# Patient Record
Sex: MALE | Race: BLACK OR AFRICAN AMERICAN | Employment: UNEMPLOYED | URBAN - METROPOLITAN AREA
[De-identification: names, ages, dates, MRNs, and addresses within clinical notes are randomized per-mention and may not be internally consistent; named-entity substitution may affect disease eponyms.]

---

## 2017-03-04 ENCOUNTER — APPOINTMENT (OUTPATIENT)
Dept: GENERAL RADIOLOGY | Facility: CLINIC | Age: 32
End: 2017-03-04
Attending: EMERGENCY MEDICINE
Payer: COMMERCIAL

## 2017-03-04 ENCOUNTER — HOSPITAL ENCOUNTER (EMERGENCY)
Facility: CLINIC | Age: 32
Discharge: HOME | End: 2017-03-05
Attending: EMERGENCY MEDICINE | Admitting: EMERGENCY MEDICINE
Payer: COMMERCIAL

## 2017-03-04 DIAGNOSIS — T40.601A OPIATE OVERDOSE, ACCIDENTAL OR UNINTENTIONAL, INITIAL ENCOUNTER (H): ICD-10-CM

## 2017-03-04 LAB
ALBUMIN SERPL-MCNC: 4.2 G/DL (ref 3.4–5)
ALP SERPL-CCNC: 64 U/L (ref 40–150)
ALT SERPL W P-5'-P-CCNC: 28 U/L (ref 0–70)
ANION GAP SERPL CALCULATED.3IONS-SCNC: 8 MMOL/L (ref 3–14)
APAP SERPL-MCNC: NORMAL MG/L (ref 10–20)
AST SERPL W P-5'-P-CCNC: 16 U/L (ref 0–45)
BASOPHILS # BLD AUTO: 0 10E9/L (ref 0–0.2)
BASOPHILS NFR BLD AUTO: 0.2 %
BILIRUB SERPL-MCNC: 1.1 MG/DL (ref 0.2–1.3)
BUN SERPL-MCNC: 10 MG/DL (ref 7–30)
CALCIUM SERPL-MCNC: 8.2 MG/DL (ref 8.5–10.1)
CHLORIDE SERPL-SCNC: 102 MMOL/L (ref 94–109)
CO2 SERPL-SCNC: 28 MMOL/L (ref 20–32)
CREAT SERPL-MCNC: 0.96 MG/DL (ref 0.66–1.25)
DIFFERENTIAL METHOD BLD: NORMAL
EOSINOPHIL # BLD AUTO: 0 10E9/L (ref 0–0.7)
EOSINOPHIL NFR BLD AUTO: 0.2 %
ERYTHROCYTE [DISTWIDTH] IN BLOOD BY AUTOMATED COUNT: 12.6 % (ref 10–15)
ETHANOL SERPL-MCNC: <0.01 G/DL
GFR SERPL CREATININE-BSD FRML MDRD: ABNORMAL ML/MIN/1.7M2
GLUCOSE SERPL-MCNC: 83 MG/DL (ref 70–99)
HCT VFR BLD AUTO: 43.5 % (ref 40–53)
HGB BLD-MCNC: 14.8 G/DL (ref 13.3–17.7)
IMM GRANULOCYTES # BLD: 0 10E9/L (ref 0–0.4)
IMM GRANULOCYTES NFR BLD: 0.2 %
LYMPHOCYTES # BLD AUTO: 1.9 10E9/L (ref 0.8–5.3)
LYMPHOCYTES NFR BLD AUTO: 40.9 %
MCH RBC QN AUTO: 31 PG (ref 26.5–33)
MCHC RBC AUTO-ENTMCNC: 34 G/DL (ref 31.5–36.5)
MCV RBC AUTO: 91 FL (ref 78–100)
MONOCYTES # BLD AUTO: 0.3 10E9/L (ref 0–1.3)
MONOCYTES NFR BLD AUTO: 6.4 %
NEUTROPHILS # BLD AUTO: 2.4 10E9/L (ref 1.6–8.3)
NEUTROPHILS NFR BLD AUTO: 52.1 %
NRBC # BLD AUTO: 0 10*3/UL
NRBC BLD AUTO-RTO: 0 /100
PLATELET # BLD AUTO: 239 10E9/L (ref 150–450)
POTASSIUM SERPL-SCNC: 3.7 MMOL/L (ref 3.4–5.3)
PROT SERPL-MCNC: 8 G/DL (ref 6.8–8.8)
RBC # BLD AUTO: 4.78 10E12/L (ref 4.4–5.9)
SALICYLATES SERPL-MCNC: NORMAL MG/DL
SODIUM SERPL-SCNC: 138 MMOL/L (ref 133–144)
WBC # BLD AUTO: 4.7 10E9/L (ref 4–11)

## 2017-03-04 PROCEDURE — 70360 X-RAY EXAM OF NECK: CPT

## 2017-03-04 PROCEDURE — 85025 COMPLETE CBC W/AUTO DIFF WBC: CPT | Performed by: EMERGENCY MEDICINE

## 2017-03-04 PROCEDURE — 96374 THER/PROPH/DIAG INJ IV PUSH: CPT

## 2017-03-04 PROCEDURE — 80053 COMPREHEN METABOLIC PANEL: CPT | Performed by: EMERGENCY MEDICINE

## 2017-03-04 PROCEDURE — 25000128 H RX IP 250 OP 636: Performed by: EMERGENCY MEDICINE

## 2017-03-04 PROCEDURE — 80329 ANALGESICS NON-OPIOID 1 OR 2: CPT | Performed by: EMERGENCY MEDICINE

## 2017-03-04 PROCEDURE — 99284 EMERGENCY DEPT VISIT MOD MDM: CPT | Mod: 25

## 2017-03-04 PROCEDURE — 80320 DRUG SCREEN QUANTALCOHOLS: CPT | Performed by: EMERGENCY MEDICINE

## 2017-03-04 RX ORDER — ONDANSETRON 2 MG/ML
4 INJECTION INTRAMUSCULAR; INTRAVENOUS ONCE
Status: DISCONTINUED | OUTPATIENT
Start: 2017-03-04 | End: 2017-03-05 | Stop reason: HOSPADM

## 2017-03-04 RX ORDER — LIDOCAINE 40 MG/G
CREAM TOPICAL
Status: DISCONTINUED | OUTPATIENT
Start: 2017-03-04 | End: 2017-03-05 | Stop reason: HOSPADM

## 2017-03-04 RX ORDER — NALOXONE HYDROCHLORIDE 0.4 MG/ML
.1-.4 INJECTION, SOLUTION INTRAMUSCULAR; INTRAVENOUS; SUBCUTANEOUS
Status: DISCONTINUED | OUTPATIENT
Start: 2017-03-04 | End: 2017-03-05 | Stop reason: HOSPADM

## 2017-03-04 RX ADMIN — NALOXONE HYDROCHLORIDE 0.1 MG: 0.4 INJECTION, SOLUTION INTRAMUSCULAR; INTRAVENOUS; SUBCUTANEOUS at 23:12

## 2017-03-04 ASSESSMENT — ENCOUNTER SYMPTOMS
VOMITING: 0
DIZZINESS: 0
LIGHT-HEADEDNESS: 0
FEVER: 0
SORE THROAT: 0
NAUSEA: 0
SHORTNESS OF BREATH: 0
CHILLS: 0
FACIAL SWELLING: 0
RHINORRHEA: 0
COUGH: 0
HEADACHES: 0
TROUBLE SWALLOWING: 1
NECK PAIN: 1

## 2017-03-04 NOTE — ED AVS SNAPSHOT
Emergency Department    [address] [place] [address]    Phone:  [phone]    Fax:  [phone]                                       Brian Rodas   MRN: [id number]    Department:   Emergency Department   Date of Visit:  3/4/2017           After Visit Summary Signature Page     I have received my discharge instructions, and my questions have been answered. I have discussed any challenges I see with this plan with the nurse or doctor.    ..........................................................................................................................................  Patient/Patient Representative Signature      ..........................................................................................................................................  Patient Representative Print Name and Relationship to Patient    ..................................................               ................................................  Date                                            Time    ..........................................................................................................................................  Reviewed by Signature/Title    ...................................................              ..............................................  Date                                                            Time

## 2017-03-04 NOTE — ED AVS SNAPSHOT
Emergency Department    [address] [facility] [address]    Phone:  [phone]    Fax:  [phone]                                       Brian Rodas   MRN: [id number]    Department:   Emergency Department   Date of Visit:  3/4/2017           Patient Information     Date Of Birth          1985        Your diagnoses for this visit were:     Opiate overdose, accidental or unintentional, initial encounter        You were seen by Ismael Carmichael MD.      Follow-up Information     Follow up with [place] MD, [place]. Schedule an appointment as soon as possible for a visit in 2 days.    Specialty:  Clinic    Why:  As needed        Follow up with  Emergency Department.    Specialty:  EMERGENCY MEDICINE    Why:  If symptoms worsen    Contact information:    [address] [facility] [id number] [phone]        Discharge Instructions         Accidental Ingestion: Nontoxic (Adult)  You have been evaluated and treated for accidentally taking too much of a medicine or swallowing a chemical product. There is no sign of toxic effect at this time. It is not likely that any new symptoms will appear. To be safe, watch for symptoms during the next 24 hours (see below). The symptom will depend on what was swallowed.  Home care    If liquid charcoal was given to neutralize what was swallowed, it will give a black color to the stools for 1 to 2 days. Usually, a laxative is given with charcoal. This speeds the removal of any toxins from the intestines. This may cause diarrhea for up to 24 hours.    If you have been given charcoal but no laxative, you may become constipated. If this occurs, you may take an over-the-counter laxative.  Prevention    Keep medicines, pesticides, and other household chemicals in their original containers.    Clearly radha all harmful products if a different bottle is used.  Note: In the future, if you or someone you know takes something possibly  harmful, and you are not sure what to do, call the [employer] of Poison Control Centers. The phone number is 1-651.621.8399. The phone line is staffed 24 hours a day. If you call, you will be connected to the poison control center closest to you.   Follow-up care  Follow up with your health care provider, or as advised.  Call 101  Contact your local emergency services right away if any of these occur.    Trouble breathing or swallowing, wheezing    Severe confusion    Extreme drowsiness or trouble awakening    Fainting or loss of consciousness    Rapid heart rate     Very slow heart rate    Very low or very high blood pressure    Vomiting blood, or large amounts of blood in stool    Seizure  When to seek medical advice  Call your health care provider right away if any of these occur.    Shakiness    Fast breathing (over 25 breaths per minute) or slow breathing (less than 8 breaths per minute)    Shortness of breath    Fever of 100.4 F (38 C) or higher, or as directed by your healthcare provider    Vomiting or diarrhea for more than 24 hours    Abdominal pain    Dizziness or weakness    1797-0471 [employer]. [address] [place] [address]. All rights reserved. This information is not intended as a substitute for professional medical care. Always follow your healthcare professional's instructions.          Discharge References/Attachments     OVERDOSE, OPIATE (ENGLISH)      24 Hour Appointment Hotline       To make an appointment at any [facility], call [phone] ([phone]). If you don't have a family doctor or clinic, we will help you find one. [facility] clinics are conveniently located to serve the needs of you and your family.             Review of your medicines      Our records show that you are taking the medicines listed below. If these are incorrect, please call your family doctor or clinic.        Dose / Directions Last dose taken    alum & mag  hydroxide-simethicone 200-200-20 MG/5ML Susp suspension   Commonly known as:  MYLANTA/MAALOX   Dose:  30 mL        Take 30 mLs by mouth every 6 hours as needed for indigestion   Refills:  0        cyclobenzaprine 10 MG tablet   Commonly known as:  FLEXERIL   Dose:  10 mg   Quantity:  30 tablet        Take 1 tablet (10 mg) by mouth At Bedtime   Refills:  0        diphenhydrAMINE 50 MG capsule   Commonly known as:  BENADRYL   Dose:  50 mg   Quantity:  30 capsule        Take 1 capsule (50 mg) by mouth At Bedtime   Refills:  0        guaiFENesin 20 mg/mL Soln solution   Commonly known as:  ROBITUSSIN   Dose:  10 mL        Take 10 mLs by mouth every 4 hours as needed for cough   Refills:  0        hydrOXYzine 25 MG tablet   Commonly known as:  ATARAX   Dose:  25-50 mg   Quantity:  60 tablet        Take 1-2 tablets (25-50 mg) by mouth every 4 hours as needed for anxiety   Refills:  1        IBUPROFEN PO   Dose:  200-400 mg        Take 200-400 mg by mouth every 6 hours as needed for moderate pain   Refills:  0        loperamide 2 MG capsule   Commonly known as:  IMODIUM   Dose:  2 mg        Take 2 mg by mouth 4 times daily as needed for diarrhea   Refills:  0        loratadine 10 MG tablet   Commonly known as:  CLARITIN   Dose:  10 mg        Take 10 mg by mouth daily as needed for allergies   Refills:  0        magnesium hydroxide 2400 MG/10ML Susp   Commonly known as:  MOM   Dose:  30 mL        Take 30 mLs by mouth daily as needed for constipation   Refills:  0        mirtazapine 30 MG tablet   Commonly known as:  REMERON   Dose:  30 mg   Indication:  Trouble Sleeping   Quantity:  30 tablet        Take 1 tablet (30 mg) by mouth At Bedtime   Refills:  0        NICORELIEF 4 MG gum   Dose:  4 mg   Generic drug:  nicotine polacrilex        Place 4 mg inside cheek as needed for smoking cessation   Refills:  0        phenol-menthol 14.5 MG lozenge   Dose:  1 lozenge        Place 1 lozenge inside cheek every 2 hours as needed  for moderate pain   Refills:  0        sertraline 100 MG tablet   Commonly known as:  ZOLOFT   Dose:  100 mg   Quantity:  30 tablet        Take 1 tablet (100 mg) by mouth daily   Refills:  0        traZODone 100 MG tablet   Commonly known as:  DESYREL   Dose:   mg   Quantity:  30 tablet        Take 0.5-1 tablets ( mg) by mouth nightly as needed for sleep   Refills:  1                Procedures and tests performed during your visit     Acetaminophen level    CBC with platelets differential    Comprehensive metabolic panel    Ethanol level    Neck soft tissue XR    Salicylate level      Orders Needing Specimen Collection     Ordered          03/04/17 2253  Drug abuse screen 77 urine ([place] - STAT, [place]: STAT, Needs to be Collected     Scheduled Task Status   03/04/17 2251 Collect Drug abuse screen 77 urine ([place]) Open   Order Class:  PCU Collect                  Pending Results     No orders found for last 3 day(s).            Pending Culture Results     No orders found for last 3 day(s).             Test Results from your hospital stay     3/4/2017 11:11 PM - Interface, UrGift Results      Component Results     Component Value Ref Range & Units Status    WBC 4.7 4.0 - 11.0 10e9/L Final    RBC Count 4.78 4.4 - 5.9 10e12/L Final    Hemoglobin 14.8 13.3 - 17.7 g/dL Final    Hematocrit 43.5 40.0 - 53.0 % Final    MCV 91 78 - 100 fl Final    MCH 31.0 26.5 - 33.0 pg Final    MCHC 34.0 31.5 - 36.5 g/dL Final    RDW 12.6 10.0 - 15.0 % Final    Platelet Count 239 150 - 450 10e9/L Final    Diff Method Automated Method  Final    % Neutrophils 52.1 % Final    % Lymphocytes 40.9 % Final    % Monocytes 6.4 % Final    % Eosinophils 0.2 % Final    % Basophils 0.2 % Final    % Immature Granulocytes 0.2 % Final    Nucleated RBCs 0 0 /100 Final    Absolute Neutrophil 2.4 1.6 - 8.3 10e9/L Final    Absolute Lymphocytes 1.9 0.8 - 5.3 10e9/L Final    Absolute Monocytes 0.3 0.0 - 1.3 10e9/L Final    Absolute  Eosinophils 0.0 0.0 - 0.7 10e9/L Final    Absolute Basophils 0.0 0.0 - 0.2 10e9/L Final    Abs Immature Granulocytes 0.0 0 - 0.4 10e9/L Final    Absolute Nucleated RBC 0.0  Final         3/4/2017 11:27 PM - Interface, Flexilab Results      Component Results     Component Value Ref Range & Units Status    Sodium 138 133 - 144 mmol/L Final    Potassium 3.7 3.4 - 5.3 mmol/L Final    Chloride 102 94 - 109 mmol/L Final    Carbon Dioxide 28 20 - 32 mmol/L Final    Anion Gap 8 3 - 14 mmol/L Final    Glucose 83 70 - 99 mg/dL Final    Urea Nitrogen 10 7 - 30 mg/dL Final    Creatinine 0.96 0.66 - 1.25 mg/dL Final    GFR Estimate >90  Non  GFR Calc   >60 mL/min/1.7m2 Final    GFR Estimate If Black >90   GFR Calc   >60 mL/min/1.7m2 Final    Calcium 8.2 (L) 8.5 - 10.1 mg/dL Final    Bilirubin Total 1.1 0.2 - 1.3 mg/dL Final    Albumin 4.2 3.4 - 5.0 g/dL Final    Protein Total 8.0 6.8 - 8.8 g/dL Final    Alkaline Phosphatase 64 40 - 150 U/L Final    ALT 28 0 - 70 U/L Final    AST 16 0 - 45 U/L Final         3/4/2017 11:50 PM - Interface, Flexilab Results      Component Results     Component Value Ref Range & Units Status    Salicylate Level <2  Therapeutic:        <20   Anti inflammatory:  15-30   mg/dL Final         3/4/2017 11:52 PM - Interface, Flexilab Results      Component Results     Component Value Ref Range & Units Status    Acetaminophen Level <2  Therapeutic range: 10-20 mg/L   mg/L Final         3/4/2017 11:25 PM - Interface, Flexilab Results      Component Results     Component Value Ref Range & Units Status    Ethanol g/dL <0.01 <0.01 g/dL Final         3/4/2017 11:50 PM - Interface, Radiant Ib      Narrative     SOFT TISSUE NECK ONE VIEW   3/4/2017 11:47 PM     HISTORY: Dysphagia.    COMPARISON: None.        Impression     IMPRESSION: No prevertebral soft tissue swelling. The epiglottis is  normal. No cause for dysphagia is identified.    KIRAN PERSON MD                Clinical  Quality Measure: Blood Pressure Screening     Your blood pressure was checked while you were in the emergency department today. The last reading we obtained was  BP: 133/88 . Please read the guidelines below about what these numbers mean and what you should do about them.  If your systolic blood pressure (the top number) is less than 120 and your diastolic blood pressure (the bottom number) is less than 80, then your blood pressure is normal. There is nothing more that you need to do about it.  If your systolic blood pressure (the top number) is 120-139 or your diastolic blood pressure (the bottom number) is 80-89, your blood pressure may be higher than it should be. You should have your blood pressure rechecked within a year by a primary care provider.  If your systolic blood pressure (the top number) is 140 or greater or your diastolic blood pressure (the bottom number) is 90 or greater, you may have high blood pressure. High blood pressure is treatable, but if left untreated over time it can put you at risk for heart attack, stroke, or kidney failure. You should have your blood pressure rechecked by a primary care provider within the next 4 weeks.  If your provider in the emergency department today gave you specific instructions to follow-up with your doctor or provider even sooner than that, you should follow that instruction and not wait for up to 4 weeks for your follow-up visit.        Thank you for choosing [facility]       Thank you for choosing [facility] for your care. Our goal is always to provide you with excellent care. Hearing back from our patients is one way we can continue to improve our services. Please take a few minutes to complete the written survey that you may receive in the mail after you visit with us. Thank you!        [facility] Information     [facility] lets you send messages to your doctor, view your test results, renew your prescriptions, schedule appointments and more. To sign up, go to  [email or web address] . Click on \"Log in\" on the left side of the screen, which will take you to the Welcome page. Then click on \"Sign up Now\" on the right side of the page.     You will be asked to enter the access code listed below, as well as some personal information. Please follow the directions to create your username and password.     Your access code is: [email or web address]  Expires: 6/3/2017  1:57 AM     Your access code will  in 90 days. If you need help or a new code, please call your [facility] clinic or [phone].        Care EveryWhere ID     This is your Care EveryWhere ID. This could be used by other organizations to access your [facility] medical records  [id number]        After Visit Summary       This is your record. Keep this with you and show to your community pharmacist(s) and doctor(s) at your next visit.                  "

## 2017-03-05 VITALS
TEMPERATURE: 98.6 F | WEIGHT: 146.6 LBS | RESPIRATION RATE: 16 BRPM | DIASTOLIC BLOOD PRESSURE: 80 MMHG | SYSTOLIC BLOOD PRESSURE: 132 MMHG | HEIGHT: 67 IN | BODY MASS INDEX: 23.01 KG/M2 | HEART RATE: 76 BPM | OXYGEN SATURATION: 97 %

## 2017-03-05 ASSESSMENT — ENCOUNTER SYMPTOMS: BACK PAIN: 1

## 2017-03-05 NOTE — ED PROVIDER NOTES
"  History     Chief Complaint:  Difficulty Swallowing    HPI   Brian Rodas is a 31 year old male with a history of chronic back pain, substance abuse and heroine overdose in June 2016 who presents with concern for difficulty swallowing.  The patient reports that he has been sober from opiates for a year. The patient reports that he has chronic back pain but had exacerbation of his chronic pain this evening after lifting. He reports that he only took one Oxycodone around 8:30 this evening, but brother states that he took \"way too much medication\". The patient reports that he last ate a meal around 1pm. He tried eating a granola bar around 9pm this evening but states he was unable to swallow it, so he tried to take some sips of water but these also would not go down. The patient continued to have difficulty swallowing, prompting him to come to the ED for evaluation. On arrival to the ED, the patient reports that it feels like the muscles in his throat \"just aren't working\" and he feels like nothing will go down his throat. He states he has never had difficulty swallowing like this in the past. He reports that he feels very drowsy since taking the Oxycodone. He also states that he has pain in the back of his neck that he has had for the last couple of days. He denies any throat swelling, sore throat, throat tightness, drooling, or sensation of something stuck in his throat. He denies any recent sore throat, cough, congestion, or illness. No headache, chest pain, shortness of breath, rash, or itchiness. He denies any recent medication changes or new exposures.     Patient later states on re-evaluation that he took 40 mg of crushed up Oxycontin that was not his.     Allergies:  No Known Allergies     Medications:    nicotine polacrilex (NICORELIEF) 4 MG gum  traZODone (DESYREL) 100 MG tablet  diphenhydrAMINE (BENADRYL) 50 MG capsule  IBUPROFEN PO  guaiFENesin (ROBITUSSIN) 100 MG/5ML SOLN  loratadine (CLARITIN) 10 MG " "tablet  magnesium hydroxide (MOM) 2400 MG/10ML SUSP  loperamide (IMODIUM) 2 MG capsule  hydrOXYzine (ATARAX) 25 MG tablet  mirtazapine (REMERON) 30 MG tablet  sertraline (ZOLOFT) 100 MG tablet  cyclobenzaprine (FLEXERIL) 10 MG tablet    Past Medical History:    Anxiety  Depression  Substance abuse   Chronic back pain    Past Surgical History:    Mandible surgery    Family History:    History reviewed. No pertinent family history.     Social History:  Smoking status: Current everyday smoker  Alcohol use: No  Marital Status:  Single [1]     Review of Systems   Constitutional: Negative for chills and fever.        Drowsy   HENT: Positive for trouble swallowing. Negative for congestion, drooling, facial swelling, rhinorrhea and sore throat.    Respiratory: Negative for cough and shortness of breath.    Cardiovascular: Negative for chest pain.   Gastrointestinal: Negative for nausea and vomiting.   Musculoskeletal: Positive for back pain and neck pain.   Skin: Negative for rash.   Neurological: Negative for dizziness, light-headedness and headaches.   All other systems reviewed and are negative.      Physical Exam   Patient Vitals for the past 24 hrs:   BP Temp Temp src Pulse Resp SpO2 Height Weight   03/05/17 0240 - - - - - 97 % - -   03/05/17 0238 132/80 - - 76 16 - - -   03/05/17 0130 - - - - - 95 % - -   03/05/17 0100 - - - - - 98 % - -   03/05/17 0051 - - - - - 95 % - -   03/05/17 0042 - - - - - 100 % - -   03/04/17 2345 - - - - - 94 % - -   03/04/17 2315 - - - - - 100 % - -   03/04/17 2314 133/88 - - - - 99 % - -   03/04/17 2252 - - - - - 96 % - -   03/04/17 2233 148/69 98.6  F (37  C) Oral 101 16 98 % 1.702 m (5' 7\") 66.5 kg (146 lb 9.6 oz)       Physical Exam  Constitutional:  Appears well-developed and well-nourished. Cooperative. Sleepy but rousable. Answers questions appropriately   HENT:   Head:    Atraumatic.   Mouth/Throat:   Oropharynx is without erythema or exudate and mucous     membranes are moist. "   Eyes:    Conjunctivae normal and EOM are normal.       Pupils 2-3 mm and reactive. No photophobia.  Neck:    Normal range of motion. Neck supple. No stridor, no tenderness  Cardiovascular:  Normal rate, regular rhythm, normal heart sounds and radial and    dorsalis pedis pulses are 2+ and symmetric.    Pulmonary/Chest:  Effort normal and breath sounds normal.   Abdominal:   Soft. Bowel sounds are normal.      No splenomegaly or hepatomegaly. No tenderness. No rebound.   Musculoskeletal:  Normal range of motion. No edema and no tenderness.   Neurological:  Sleepy but rousable. Appears intoxicated. Answers questions appropriately.  Normal strength. No cranial nerve deficit.   Skin:    Skin is warm and dry.   Psychiatric:   Appears intoxicated and flat affect    Emergency Department Course   Imaging:  Radiographic findings were communicated with the patient who voiced understanding of the findings.    Neck soft tissue x-ray  No prevertebral soft tissue swelling. The epiglottis is  normal. No cause for dysphagia is identified.  As read by Radiology.    Laboratory:  CBC: WNL (WBC 4.7, HGB 14.8, )   CMP: Calcium 8.2(L), o/w WNL (Creatinine 0.96)    Ethanol level: <0.01  Acetaminophen level: <2  Salicylate level: <2    Interventions:  2312: Narcan 0.1 mg IV    Emergency Department Course:  Past medical records, nursing notes, and vitals reviewed.  2243: I performed an exam of the patient and obtained history, as documented above.  IV inserted and blood drawn. The patient was placed on continuous cardiac monitoring and pulse oximetry.  The patient was sent for a neck soft tissue x-ray while in the emergency department, findings above.    0050: I rechecked the patient. Explained findings to the patient. On recheck, the patient reports that he took 40 mg of crushed up Oxycontin that was not his own. He states his throat feels normal now. He is drinking water without any difficulty. Patient is still sleepy.     0112:  I spoke to Poison Control regarding the patient. Recommended observation for 3 hours after Narcan.     0231: I rechecked the patient. Explained findings to the patient. Patient still able to tolerate oral liquids without difficulty.     I rechecked the patient.  Findings and plan explained to the Patient. Patient discharged home with instructions regarding supportive care, medications, and reasons to return. The importance of close follow-up was reviewed.     Impression & Plan      Medical Decision Making:  Brian Rodas is a 31 year old male who presents after taking what he reported initially as one tablet of Oxycodone and then feeling sleepy and having difficulty with swallowing. There is no fevers, no throat pain, no swelling noted on exam. He does not have focal neurologic deficits. He doesn't have headache. I don't think infection or CNS lesion is the reason for his difficulty with swallowing.     On review of the patient's chart, he does have a history or opiate abuse and past overdose. He appears intoxicated here. A dose of Narcan was given. Laboratory testing was sent. There was no evidence of co-ingestion. Soft tissue neck x-ray looks normal without any prevertebral soft tissue swelling and normal looking epiglottis. After Narcan the patient reported his abnormal swelling felt normal and he was able to drink several glasses of water without any discomfort or difficulty.     After Narcan the patient was also able to tell me that he took a crushed 40 mg tablet of likely Oxycontin.    The patient said he was not trying to harm himself. He said he has been having some trouble with back pain and thought that might help his pain. He said his back pain is only 4/10. There is no radicular pain, no injury, and no role for emergent imaging of his spine.     The patient was observed for about 3 hours post his dose of Narcan. He has not become sleepy or required repeat dosing. The patient agrees to stop taking large  doses of opiates. He declined resources for treatment. He said he was not trying to harm himself. He left in good condition.     Diagnosis:    ICD-10-CM   1. Opiate overdose, accidental or unintentional, initial encounter T40.601A     Disposition: Discharged to home    Felisha Diane  3/4/2017    EMERGENCY DEPARTMENT    I, Felisha Diane, am serving as a scribe at 10:43 PM on 3/4/2017 to document services personally performed by Ismeal Carmichael MD based on my observations and the provider's statements to me.        Ismael Carmichael MD  03/05/17 0648

## 2017-03-26 ENCOUNTER — HOSPITAL ENCOUNTER (INPATIENT)
Facility: CLINIC | Age: 32
LOS: 1 days | Discharge: HOME | DRG: 881 | End: 2017-03-27
Attending: PSYCHIATRY & NEUROLOGY | Admitting: PSYCHIATRY & NEUROLOGY
Payer: COMMERCIAL

## 2017-03-26 ENCOUNTER — HOSPITAL ENCOUNTER (EMERGENCY)
Facility: CLINIC | Age: 32
Discharge: PSYCHIATRIC OR SUBSTANCE USE FACILITY | End: 2017-03-26
Attending: EMERGENCY MEDICINE | Admitting: EMERGENCY MEDICINE
Payer: COMMERCIAL

## 2017-03-26 VITALS
HEART RATE: 63 BPM | SYSTOLIC BLOOD PRESSURE: 100 MMHG | BODY MASS INDEX: 25.11 KG/M2 | OXYGEN SATURATION: 100 % | DIASTOLIC BLOOD PRESSURE: 69 MMHG | HEIGHT: 67 IN | RESPIRATION RATE: 18 BRPM | WEIGHT: 160 LBS | TEMPERATURE: 97.8 F

## 2017-03-26 DIAGNOSIS — F32.9 REACTIVE DEPRESSION: Primary | ICD-10-CM

## 2017-03-26 DIAGNOSIS — F19.10 DRUG ABUSE (H): ICD-10-CM

## 2017-03-26 DIAGNOSIS — F11.93 OPIOID WITHDRAWAL (H): ICD-10-CM

## 2017-03-26 DIAGNOSIS — F11.20 OPIOID USE DISORDER, MODERATE, DEPENDENCE (H): ICD-10-CM

## 2017-03-26 DIAGNOSIS — Z72.89 SELF-INJURIOUS BEHAVIOR: ICD-10-CM

## 2017-03-26 DIAGNOSIS — R44.0 AUDITORY HALLUCINATIONS: ICD-10-CM

## 2017-03-26 PROBLEM — F32.A DEPRESSION: Status: ACTIVE | Noted: 2017-03-26

## 2017-03-26 LAB
AMPHETAMINES UR QL SCN: ABNORMAL
BARBITURATES UR QL: ABNORMAL
BENZODIAZ UR QL: ABNORMAL
CANNABINOIDS UR QL SCN: ABNORMAL
COCAINE UR QL: ABNORMAL
OPIATES UR QL SCN: ABNORMAL
PCP UR QL SCN: ABNORMAL

## 2017-03-26 PROCEDURE — 12400001 ZZH R&B MH UMMC

## 2017-03-26 PROCEDURE — 80307 DRUG TEST PRSMV CHEM ANLYZR: CPT | Performed by: EMERGENCY MEDICINE

## 2017-03-26 PROCEDURE — 99285 EMERGENCY DEPT VISIT HI MDM: CPT | Mod: 25

## 2017-03-26 PROCEDURE — 25000132 ZZH RX MED GY IP 250 OP 250 PS 637: Performed by: STUDENT IN AN ORGANIZED HEALTH CARE EDUCATION/TRAINING PROGRAM

## 2017-03-26 PROCEDURE — 90791 PSYCH DIAGNOSTIC EVALUATION: CPT

## 2017-03-26 RX ORDER — TRAZODONE HYDROCHLORIDE 50 MG/1
50 TABLET, FILM COATED ORAL
Status: DISCONTINUED | OUTPATIENT
Start: 2017-03-26 | End: 2017-03-26

## 2017-03-26 RX ORDER — OLANZAPINE 10 MG/2ML
10 INJECTION, POWDER, FOR SOLUTION INTRAMUSCULAR
Status: DISCONTINUED | OUTPATIENT
Start: 2017-03-26 | End: 2017-03-27 | Stop reason: HOSPADM

## 2017-03-26 RX ORDER — LOPERAMIDE HCL 2 MG
2 CAPSULE ORAL 4 TIMES DAILY PRN
Status: DISCONTINUED | OUTPATIENT
Start: 2017-03-26 | End: 2017-03-27 | Stop reason: HOSPADM

## 2017-03-26 RX ORDER — OLANZAPINE 10 MG/1
10 TABLET ORAL
Status: DISCONTINUED | OUTPATIENT
Start: 2017-03-26 | End: 2017-03-27 | Stop reason: HOSPADM

## 2017-03-26 RX ORDER — GABAPENTIN 300 MG/1
300 CAPSULE ORAL 3 TIMES DAILY PRN
Status: DISCONTINUED | OUTPATIENT
Start: 2017-03-26 | End: 2017-03-27 | Stop reason: HOSPADM

## 2017-03-26 RX ORDER — TRAZODONE HYDROCHLORIDE 50 MG/1
50-100 TABLET, FILM COATED ORAL
Status: DISCONTINUED | OUTPATIENT
Start: 2017-03-26 | End: 2017-03-27 | Stop reason: HOSPADM

## 2017-03-26 RX ORDER — HYDROXYZINE HYDROCHLORIDE 25 MG/1
25-50 TABLET, FILM COATED ORAL EVERY 4 HOURS PRN
Status: DISCONTINUED | OUTPATIENT
Start: 2017-03-26 | End: 2017-03-27 | Stop reason: HOSPADM

## 2017-03-26 RX ORDER — ONDANSETRON 4 MG/1
4 TABLET, ORALLY DISINTEGRATING ORAL EVERY 6 HOURS PRN
Status: DISCONTINUED | OUTPATIENT
Start: 2017-03-26 | End: 2017-03-27 | Stop reason: HOSPADM

## 2017-03-26 RX ORDER — CLONIDINE HYDROCHLORIDE 0.1 MG/1
0.1 TABLET ORAL 3 TIMES DAILY PRN
Status: DISCONTINUED | OUTPATIENT
Start: 2017-03-26 | End: 2017-03-27 | Stop reason: HOSPADM

## 2017-03-26 RX ORDER — IBUPROFEN 200 MG
400 TABLET ORAL EVERY 6 HOURS PRN
Status: DISCONTINUED | OUTPATIENT
Start: 2017-03-26 | End: 2017-03-27 | Stop reason: HOSPADM

## 2017-03-26 RX ADMIN — TRAZODONE HYDROCHLORIDE 50 MG: 50 TABLET ORAL at 19:11

## 2017-03-26 RX ADMIN — CLONIDINE HYDROCHLORIDE 0.1 MG: 0.1 TABLET ORAL at 19:11

## 2017-03-26 ASSESSMENT — ACTIVITIES OF DAILY LIVING (ADL)
ORAL_HYGIENE: INDEPENDENT
DRESS: INDEPENDENT
LAUNDRY: WITH SUPERVISION
GROOMING: INDEPENDENT

## 2017-03-26 NOTE — IP AVS SNAPSHOT
MRN:[id number]                      After Visit Summary   3/26/2017    Brian Rodas    MRN: [id number]           Thank you!     Thank you for choosing [facility] for your care. Our goal is always to provide you with excellent care.        Patient Information     Date Of Birth          1985        About your hospital stay     You were admitted on:  March 26, 2017 You last received care in the:  [facility]    You were discharged on:  March 27, 2017       Who to Call     For medical emergencies, please call 911.  For non-urgent questions about your medical care, please call your primary care provider or clinic, None          Attending Provider     Provider Specialty    Hussein Pulliam MD Psychiatry       Primary Care Provider    [facility]       No address on file        Further instructions from your care team       Behavioral Discharge Planning and Instructions      Summary:  You were admitted to [facility] station 22  on 3/26/2017 for assessment of thoughts of suicide and depressive symptoms in the context of relapse on heroin use.  You were treated and assessed by Dr. Hussein Pulliam MD and discharged on 3/27/2017 from Station 22 to follow up with CD treatment assessment the next day -  3/28/17 in the community .    Health Care Follow-up Appointments:       Tuesday March 28, 2017 - 1:00pm     Provider: CD Assessment/ intake assessment - Octavio Engle  Address: [facility] - [address] [place] [address]  - staff [place] scheduled intake direct phone: [phone]     Attend all scheduled appointments with your outpatient providers. Call at least 24 hours in advance if you need to reschedule an appointment to ensure continued access to your outpatient providers.   Major Treatments, Procedures and Findings:  You were provided with: a psychiatric assessment, assessed for medical stability and medication evaluation and/or  "management    Symptoms to Report: feeling more aggressive, increased confusion, losing more sleep, mood getting worse, thoughts of suicide     Early warning signs can include: increased depression or anxiety sleep disturbances increased thoughts or behaviors of suicide or self-harm     Safety and Wellness:  Take all medicines as directed.  Make no changes unless your doctor suggests them.      Follow treatment recommendations.  Refrain from alcohol and non-prescribed drugs.     Resources:   Crisis Intervention: 754.331.6393 or 971-783-2740 (TTY: 259.458.4849).  Call anytime for help.  National [place] on Mental Illness (www.[place].gray.org): 800.750.1572 or 416-380-0458.  Alcoholics Anonymous (www.alcoholics-anonymous.org): Check your phone book for your local chapter.  Suicide Awareness Voices of Education (SAVE) (www.save.org): 776-915-ETLL (7283)  [facility] Crisis Response 224-922-3641  Text 4 Life: txt \"LIFE\" to 92510 for immediate support and crisis intervention  Crisis text line: Text \"START\" to 173-750. Free, confidential, 24/7.    The treatment team has appreciated the opportunity to work with you.     If you have any questions or concerns our unit number is [phone]        Pending Results     No orders found for last 3 day(s).            Statement of Approval     Ordered          03/27/17 1449  I have reviewed and agree with all the recommendations and orders detailed in this document.  EFFECTIVE NOW     Approved and electronically signed by:  Hussein Boykin MD             Admission Information     Date & Time Provider Department Dept. Phone    3/26/2017 Hussein Pulliam MD  22[facility] [phone]      Your Vitals Were     Blood Pressure Pulse Temperature Respirations          99/44 65 96  F (35.6  C) (Tympanic) 16        MyChart Information     [employer] lets you send messages to your doctor, view your test results, renew your prescriptions, schedule appointments and more. To sign up, go to " "[email or web address] . Click on \"Log in\" on the left side of the screen, which will take you to the Welcome page. Then click on \"Sign up Now\" on the right side of the page.     You will be asked to enter the access code listed below, as well as some personal information. Please follow the directions to create your username and password.     Your access code is: [email or web address]  Expires: 6/3/2017  2:57 AM     Your access code will  in 90 days. If you need help or a new code, please call your [facility] clinic or [phone].        Care EveryWhere ID     This is your Care EveryWhere ID. This could be used by other organizations to access your [facility] medical records  [id number]           Review of your medicines      Notice     You have not been prescribed any medications.             Protect others around you: Learn how to safely use, store and throw away your medicines at www.disposemymeds.org.             Medication List: This is a list of all your medications and when to take them. Check marks below indicate your daily home schedule. Keep this list as a reference.      Notice     You have not been prescribed any medications.      "
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
## 2017-03-26 NOTE — PLAN OF CARE
Problem: General Plan of Care (Inpatient Behavioral)  Goal: Individualization/Patient Specific Goal (IP Behavioral)  The patient and/or their representative will achieve their patient-specific goals related to the plan of care.    The patient-specific goals include:   Illness Management Recovery model:  Self-Reflection & Planning.     Assessed patient's progress completing forms related to Illness Management Recovery (including Personal Plan of Care, Adult Coping Plan, and My Support and Coping Plan) and assisted as needed.     Encouraged patient to continue to consider triggers, wellness strategies, early warning signs, feedback from others, actions to take to prevent relapse, and coping strategies as part of a plan to remain well after leaving the hospital.

## 2017-03-26 NOTE — ED NOTES
"Pt reports that he relapsed on heroin 2 days ago and feels like \"a failure to his family\" he is having thoughts of cutting himself to \"punish himself for being a failure\" but denies wanting to kill self  "

## 2017-03-26 NOTE — PLAN OF CARE
"Problem: Depressive Symptoms  Goal: Depressive Symptoms  Signs and symptoms of listed problems will be absent or manageable.   Accepted Brian Rodas to unit 22N arrived at 1500 transported from [facility] ED.  He states that he is depressed and had suicidal thoughts outside of hospital, \"That is why I came in.\".  Per report he broke sobriety by using heroin recently and feels very guilty about it.  He is in room 249-2 with the covers almost covering his head.  He said that he does not feel suicidal in the hospital, that he feels very cold and was given a heated blanket.  He appears anxious, wide eyed.  Requested non pork diet, message sent to resident who will be coming here to admit.  Does not feel like harming others he stated when asked but recently felt like harming self pre admission      "

## 2017-03-26 NOTE — ED PROVIDER NOTES
Brian Rodas was signed out to me by Dr. Rees at 7:00 AM and has been cooperative here, not requiring any medications. He has been seen by DEC who feels he needs to be admitted for suicidal ideation. 72 hour hold placed. He will transferred to [facility] under the care of Dr. Pulliam. Transfer forms were filled out. The patient is stable for transfer.      Julia Pompa MD  04/03/17 0709

## 2017-03-26 NOTE — IP AVS SNAPSHOT
[address] [address]    Phone:  [phone]                                       After Visit Summary   3/26/2017    Brian Rodas    MRN: [id number]           After Visit Summary Signature Page     I have received my discharge instructions, and my questions have been answered. I have discussed any challenges I see with this plan with the nurse or doctor.    ..........................................................................................................................................  Patient/Patient Representative Signature      ..........................................................................................................................................  Patient Representative Print Name and Relationship to Patient    ..................................................               ................................................  Date                                            Time    ..........................................................................................................................................  Reviewed by Signature/Title    ...................................................              ..............................................  Date                                                            Time

## 2017-03-26 NOTE — PROGRESS NOTES
H&P note dictated. In addition to routine morning labs, I will order HIV and hepatitis serologies per pt request in lieu of recent IV heroin use.    Servando Grewal MD  PGY-2 Psychiatry Resident

## 2017-03-26 NOTE — ED PROVIDER NOTES
"  History     Chief Complaint:  Self harm    HPI   Brian Rodas is a 31 year old male with a history of anxiety, depression, and chemical dependency who presents to the emergency department today for evaluation of thoughts of self-harm. Mr. Rodas reports he has been having thoughts of cutting himself because of \"family problems.\" He will elaborate further about what prompted these thoughts. He also reports starting to hear voices two days ago that are telling him to hurt himself.  He has not cut himself yet, but is worried he will, so he walked over to the hospital to get evaluated. He denies homicidal or suicidal ideation or current drug use.     Allergies:  NKDA    Medications:    Nicotine polacrilex (nicorelief) 4 mg gum  Trazodone (desyrel) 100 mg tablet  Diphenhydramine (benadryl) 50 mg capsule  Ibuprofen po  Alum & mag hydroxide-simethicone (mylanta/maalox) 200-200-20 mg/5ml susp  Guaifenesin (robitussin) 100 mg/5ml soln  Phenol-menthol (cepastat) 14.5 mg lozenge  Loratadine (claritin) 10 mg tablet  Magnesium hydroxide (mom) 2400 mg/10ml susp  Loperamide (imodium) 2 mg capsule  Hydroxyzine (atarax) 25 mg tablet  Mirtazapine (remeron) 30 mg tablet  Sertraline (zoloft) 100 mg tablet  Cyclobenzaprine (flexeril) 10 mg tablet    Past Medical History:    Anxiety  Depression  Substance abuse  Chemical dependency    Past Surgical History:    Mandible surgery    Family History:    History reviewed. No pertinent family history.    Social History:  Marital Status:   [2]  Tobacco: Current Everyday Smoker  Alcohol: Negative  Presents alone.  PCP: [facility] MD    Review of Systems   Psychiatric/Behavioral: Negative for suicidal ideas.   All other systems reviewed and are negative.    Physical Exam   First Vitals:  BP: 142/71  Heart Rate: 100  Temp: 97.8  F (36.6  C)  Resp: 16  Height: 170.2 cm (5' 7\")  Weight: 72.6 kg (160 lb)  SpO2: 100 %    Physical Exam  Nursing note and vitals reviewed.  Constitutional: "  Oriented to person, place, and time. Cooperative.   HENT:   Nose:    Nose normal.   Mouth/Throat:   Mucous membranes are normal.   Eyes:    Conjunctivae normal and EOM are normal.      Pupils are equal, round, and reactive to light.   Neck:    Trachea normal.   Cardiovascular:  Normal rate, regular rhythm, normal heart sounds and normal pulses. No murmur heard.  Pulmonary/Chest:  Effort normal and breath sounds normal.   Abdominal:   Soft. Normal appearance and bowel sounds are normal.      There is no tenderness.      There is no rebound and no CVA tenderness.   Musculoskeletal:  Extremities atraumatic x 4.   Lymphadenopathy:  No cervical adenopathy.   Neurological:   Alert and oriented to person, place, and time. Normal strength.      No cranial nerve deficit or sensory deficit. GCS eye subscore is 4. GCS verbal subscore is 5. GCS motor subscore is 6.   Skin:    Skin is intact. Not. rash noted.   Psychiatric:   Endorses self-harm thoughts, but denies actual suicidal ideation, but also endorses auditory hallucinations. Denies homicidal ideation and visual hallucinations.     Emergency Department Course   Laboratory:  Drug abuse screen 77 urine: Positive for Opiates.     Emergency Department Course:  Nursing notes and vitals reviewed.    02:02 I performed an exam of the patient as documented above.     Urine collected. This was sent to the lab for further testing, results above.    Patient is signed off to my partner,Dr. Pompa.     Impression & Plan    Medical Decision Making:  Brian Rodas is a 31 year old male who came in with hallucinations and thoughts of harming himself. He actually indicates he is not suicidal. Regardless, I am a little concerned due to his hallucinations, that he may actually hurt himself significantly. We placed him on a transport hold. At this pint, we are awaiting a DEC assessment. I anticipate that he may be able to be discharged home. However, there is a history of drug abuse  even though he indicates he has not been recently using drugs; although his UDS is positive for opiates. He also has a history of depression and anxiety. All the appropriate paper work has been filled out in case he ends up being admitted to another facility. I will be signing him out to my partner, Dr. Pompa, who will be assuming his care and determining his disposition.     Diagnosis:    ICD-10-CM    1. Self-injurious behavior F48.9    2. Auditory hallucinations R44.0    3. Drug abuse F19.10        Disposition:  Patient is signed off to my partner,Dr. Pompa.       Scribe Disclosure:  I, Pam Joseph, am serving as a scribe at 2:02 AM on 3/26/2017 to document services personally performed by Peter Rees MD, based on my observations and the provider's statements to me.  Pam Joseph  3/26/2017    EMERGENCY DEPARTMENT       Peter Rees MD  03/26/17 0712

## 2017-03-26 NOTE — PROGRESS NOTES
03/26/17 1509   Patient Belongings   Did you bring any home meds/supplements to the hospital?  No   Patient Belongings clothing;cell phone/electronics;money (see comment);shoes;wallet;other (see comments)   Disposition of Belongings All items in locker.   Belongings Search Yes   Clothing Search Yes   Second Staff AT   Gray Nike Shoes  Gray socks  Blue pants  Brown belt  Plaid shirt  Black jacket  Green coat  Black hat  Blue underwear  Black Wallet with ID  Cell phone  $1  ADMISSION:  I am responsible for any personal items that are not sent to the safe or pharmacy. [facility] is not responsible for loss, theft or damage of any property in my possession.    Patient Signature _____________________ Date/Time _____________________    Staff Signature _______________________ Date/Time _____________________    2nd Staff person, if patient is unable/unwilling to sign  ___________________________________ Date/Time _____________________  DISCHARGE:  All personal items have been returned to me.    Patient Signature _____________________ Date/Time _____________________    Staff Signature _______________________ Date/Time _____________________

## 2017-03-27 VITALS
TEMPERATURE: 96 F | DIASTOLIC BLOOD PRESSURE: 44 MMHG | RESPIRATION RATE: 16 BRPM | HEART RATE: 65 BPM | SYSTOLIC BLOOD PRESSURE: 99 MMHG

## 2017-03-27 PROBLEM — F32.9 REACTIVE DEPRESSION: Status: ACTIVE | Noted: 2017-03-26

## 2017-03-27 LAB
ALBUMIN SERPL-MCNC: 3.5 G/DL (ref 3.4–5)
ALP SERPL-CCNC: 54 U/L (ref 40–150)
ALT SERPL W P-5'-P-CCNC: 26 U/L (ref 0–70)
ANION GAP SERPL CALCULATED.3IONS-SCNC: 6 MMOL/L (ref 3–14)
AST SERPL W P-5'-P-CCNC: 14 U/L (ref 0–45)
BASOPHILS # BLD AUTO: 0 10E9/L (ref 0–0.2)
BASOPHILS NFR BLD AUTO: 0.2 %
BILIRUB SERPL-MCNC: 0.6 MG/DL (ref 0.2–1.3)
BUN SERPL-MCNC: 9 MG/DL (ref 7–30)
CALCIUM SERPL-MCNC: 8.9 MG/DL (ref 8.5–10.1)
CHLORIDE SERPL-SCNC: 108 MMOL/L (ref 94–109)
CO2 SERPL-SCNC: 27 MMOL/L (ref 20–32)
CREAT SERPL-MCNC: 0.84 MG/DL (ref 0.66–1.25)
DIFFERENTIAL METHOD BLD: NORMAL
EOSINOPHIL # BLD AUTO: 0.1 10E9/L (ref 0–0.7)
EOSINOPHIL NFR BLD AUTO: 1.9 %
ERYTHROCYTE [DISTWIDTH] IN BLOOD BY AUTOMATED COUNT: 11.8 % (ref 10–15)
GFR SERPL CREATININE-BSD FRML MDRD: ABNORMAL ML/MIN/1.7M2
GLUCOSE SERPL-MCNC: 106 MG/DL (ref 70–99)
HBV SURFACE AG SERPL QL IA: NONREACTIVE
HCT VFR BLD AUTO: 44 % (ref 40–53)
HCV AB SERPL QL IA: NORMAL
HGB BLD-MCNC: 14.9 G/DL (ref 13.3–17.7)
HIV 1+2 AB+HIV1 P24 AG SERPL QL IA: NORMAL
IMM GRANULOCYTES # BLD: 0 10E9/L (ref 0–0.4)
IMM GRANULOCYTES NFR BLD: 0.2 %
LYMPHOCYTES # BLD AUTO: 2.2 10E9/L (ref 0.8–5.3)
LYMPHOCYTES NFR BLD AUTO: 46.1 %
MCH RBC QN AUTO: 30.5 PG (ref 26.5–33)
MCHC RBC AUTO-ENTMCNC: 33.9 G/DL (ref 31.5–36.5)
MCV RBC AUTO: 90 FL (ref 78–100)
MONOCYTES # BLD AUTO: 0.4 10E9/L (ref 0–1.3)
MONOCYTES NFR BLD AUTO: 9.2 %
NEUTROPHILS # BLD AUTO: 2 10E9/L (ref 1.6–8.3)
NEUTROPHILS NFR BLD AUTO: 42.4 %
NRBC # BLD AUTO: 0 10*3/UL
NRBC BLD AUTO-RTO: 0 /100
PLATELET # BLD AUTO: 325 10E9/L (ref 150–450)
POTASSIUM SERPL-SCNC: 5 MMOL/L (ref 3.4–5.3)
PROT SERPL-MCNC: 7.5 G/DL (ref 6.8–8.8)
RBC # BLD AUTO: 4.89 10E12/L (ref 4.4–5.9)
SODIUM SERPL-SCNC: 141 MMOL/L (ref 133–144)
T4 FREE SERPL-MCNC: 1.27 NG/DL (ref 0.76–1.46)
TSH SERPL DL<=0.005 MIU/L-ACNC: 0.22 MU/L (ref 0.4–4)
WBC # BLD AUTO: 4.8 10E9/L (ref 4–11)

## 2017-03-27 PROCEDURE — 87389 HIV-1 AG W/HIV-1&-2 AB AG IA: CPT | Performed by: STUDENT IN AN ORGANIZED HEALTH CARE EDUCATION/TRAINING PROGRAM

## 2017-03-27 PROCEDURE — 84439 ASSAY OF FREE THYROXINE: CPT | Performed by: STUDENT IN AN ORGANIZED HEALTH CARE EDUCATION/TRAINING PROGRAM

## 2017-03-27 PROCEDURE — 86803 HEPATITIS C AB TEST: CPT | Performed by: STUDENT IN AN ORGANIZED HEALTH CARE EDUCATION/TRAINING PROGRAM

## 2017-03-27 PROCEDURE — 36415 COLL VENOUS BLD VENIPUNCTURE: CPT | Performed by: STUDENT IN AN ORGANIZED HEALTH CARE EDUCATION/TRAINING PROGRAM

## 2017-03-27 PROCEDURE — 87340 HEPATITIS B SURFACE AG IA: CPT | Performed by: STUDENT IN AN ORGANIZED HEALTH CARE EDUCATION/TRAINING PROGRAM

## 2017-03-27 PROCEDURE — 84443 ASSAY THYROID STIM HORMONE: CPT | Performed by: STUDENT IN AN ORGANIZED HEALTH CARE EDUCATION/TRAINING PROGRAM

## 2017-03-27 PROCEDURE — 25000132 ZZH RX MED GY IP 250 OP 250 PS 637: Performed by: STUDENT IN AN ORGANIZED HEALTH CARE EDUCATION/TRAINING PROGRAM

## 2017-03-27 PROCEDURE — 80053 COMPREHEN METABOLIC PANEL: CPT | Performed by: STUDENT IN AN ORGANIZED HEALTH CARE EDUCATION/TRAINING PROGRAM

## 2017-03-27 PROCEDURE — 85025 COMPLETE CBC W/AUTO DIFF WBC: CPT | Performed by: STUDENT IN AN ORGANIZED HEALTH CARE EDUCATION/TRAINING PROGRAM

## 2017-03-27 PROCEDURE — 99236 HOSP IP/OBS SAME DATE HI 85: CPT | Mod: GC | Performed by: PSYCHIATRY & NEUROLOGY

## 2017-03-27 RX ADMIN — CLONIDINE HYDROCHLORIDE 0.1 MG: 0.1 TABLET ORAL at 09:28

## 2017-03-27 ASSESSMENT — ACTIVITIES OF DAILY LIVING (ADL)
ORAL_HYGIENE: INDEPENDENT
DRESS: INDEPENDENT
GROOMING: INDEPENDENT
DRESS: STREET CLOTHES;INDEPENDENT
LAUNDRY: WITH SUPERVISION
GROOMING: HANDWASHING;SHOWER;INDEPENDENT
ORAL_HYGIENE: INDEPENDENT

## 2017-03-27 NOTE — PLAN OF CARE
Problem: Depressive Symptoms  Goal: Depressive Symptoms  Signs and symptoms of listed problems will be absent or manageable.   31 year old male admitted on 72 hr hold from [facility] er per transport with dx of substance induced mood disorder,pt reportedly relapsed on heroin last week has been injecting 1 gram per day for 1 week,last use wasfriday,pt states he feels shame ,he  last week and has a 10 month old daughter,he has had urges to cut himself and has ah,he states that he feels hopeless,he scored 7 on opiate withdrawal scale and received catapres this pm,he was placed on 15 minute checks and contracts for safety,he appears to feel quite lethargic and guarded,build trust,team assessment in am,please refer to resident note for further info

## 2017-03-27 NOTE — DISCHARGE INSTRUCTIONS
Behavioral Discharge Planning and Instructions      Summary:  You were admitted to [facility] station 22  on 3/26/2017 for assessment of thoughts of suicide and depressive symptoms in the context of relapse on heroin use.  You were treated and assessed by Dr. Hussien Pulliam MD and discharged on 3/27/2017 from Station [facility] to follow up with CD treatment assessment the next day -  3/28/17 in the community .    Health Care Follow-up Appointments:       Tuesday March 28, 2017 - 1:00pm     Provider: CD Assessment/ intake assessment - Octavio Engle  Address: [facility] - [address] [place] [address]  - staff Shelly scheduled intake direct phone: [phone]     Attend all scheduled appointments with your outpatient providers. Call at least 24 hours in advance if you need to reschedule an appointment to ensure continued access to your outpatient providers.   Major Treatments, Procedures and Findings:  You were provided with: a psychiatric assessment, assessed for medical stability and medication evaluation and/or management    Symptoms to Report: feeling more aggressive, increased confusion, losing more sleep, mood getting worse, thoughts of suicide     Early warning signs can include: increased depression or anxiety sleep disturbances increased thoughts or behaviors of suicide or self-harm     Safety and Wellness:  Take all medicines as directed.  Make no changes unless your doctor suggests them.      Follow treatment recommendations.  Refrain from alcohol and non-prescribed drugs.     Resources:   Crisis Intervention: 624.412.9358 or 039-928-1705 (TTY: 914.556.9409).  Call anytime for help.  National [place] on Mental Illness (www.mn.gray.org): 129.317.6315 or 522-706-1102.  Alcoholics Anonymous (www.alcoholics-anonymous.org): Check your phone book for your local chapter.  Suicide Awareness Voices of Education (SAVE) (www.save.org): 145-742-WGMB (2878)  [facility]  "Crisis Response 553-197-5509  Text 4 Life: txt \"LIFE\" to 89688 for immediate support and crisis intervention  Crisis text line: Text \"START\" to 527-293. Free, confidential, 24/7.    The treatment team has appreciated the opportunity to work with you.     If you have any questions or concerns our unit number is [phone]      "

## 2017-03-27 NOTE — DISCHARGE SUMMARY
"    ----------------------------------------------------------------------------------------------------------  [facility], [place]   Discharge Summary      Brian Rodas MRN# [id number]   Age: 31 year old YOB: 1985     Date of Admission:  3/26/2017  Date of Discharge:  3/27/2017  Admitting Physician:  Hussein Pulliam MD  Discharge Physician:  Hussein Pulliam MD         Event Leading to Hospitalization:   Brian Rodas is a 31-year-old male who presents with acute onset depression and thoughts of self-harm in the context of recent heroin relapse. The patient had been sober for approximately 1-1/2 years when he relapsed on IV heroin for \"no apparent reason.\" The relapse occurred last Saturday and he had been using heroin daily until Friday. He used approximately 1 gram per day over this span of time.        See Admission note by Hussein Pulliam MD on 3/26/2017 for additional details.          Diagnoses:     (F32.9) Reactive depression  (primary encounter diagnosis)    (F11.20) Opioid use disorder, moderate, dependence (H)    (F11.23) Opioid withdrawal (H)           Labs:     Results for orders placed or performed during the hospital encounter of 03/26/17   CBC with platelets differential   Result Value Ref Range    WBC 4.8 4.0 - 11.0 10e9/L    RBC Count 4.89 4.4 - 5.9 10e12/L    Hemoglobin 14.9 13.3 - 17.7 g/dL    Hematocrit 44.0 40.0 - 53.0 %    MCV 90 78 - 100 fl    MCH 30.5 26.5 - 33.0 pg    MCHC 33.9 31.5 - 36.5 g/dL    RDW 11.8 10.0 - 15.0 %    Platelet Count 325 150 - 450 10e9/L    Diff Method Automated Method     % Neutrophils 42.4 %    % Lymphocytes 46.1 %    % Monocytes 9.2 %    % Eosinophils 1.9 %    % Basophils 0.2 %    % Immature Granulocytes 0.2 %    Nucleated RBCs 0 0 /100    Absolute Neutrophil 2.0 1.6 - 8.3 10e9/L    Absolute Lymphocytes 2.2 0.8 - 5.3 10e9/L    Absolute Monocytes 0.4 0.0 - 1.3 10e9/L    Absolute Eosinophils 0.1 0.0 - 0.7 10e9/L    " Absolute Basophils 0.0 0.0 - 0.2 10e9/L    Abs Immature Granulocytes 0.0 0 - 0.4 10e9/L    Absolute Nucleated RBC 0.0    Comprehensive metabolic panel   Result Value Ref Range    Sodium 141 133 - 144 mmol/L    Potassium 5.0 3.4 - 5.3 mmol/L    Chloride 108 94 - 109 mmol/L    Carbon Dioxide 27 20 - 32 mmol/L    Anion Gap 6 3 - 14 mmol/L    Glucose 106 (H) 70 - 99 mg/dL    Urea Nitrogen 9 7 - 30 mg/dL    Creatinine 0.84 0.66 - 1.25 mg/dL    GFR Estimate >90  Non  GFR Calc   >60 mL/min/1.7m2    GFR Estimate If Black >90   GFR Calc   >60 mL/min/1.7m2    Calcium 8.9 8.5 - 10.1 mg/dL    Bilirubin Total 0.6 0.2 - 1.3 mg/dL    Albumin 3.5 3.4 - 5.0 g/dL    Protein Total 7.5 6.8 - 8.8 g/dL    Alkaline Phosphatase 54 40 - 150 U/L    ALT 26 0 - 70 U/L    AST 14 0 - 45 U/L   TSH with free T4 reflex and/or T3 as indicated   Result Value Ref Range    TSH 0.22 (L) 0.40 - 4.00 mU/L   HIV Antigen Antibody Combo   Result Value Ref Range    HIV Antigen Antibody Combo  NR     Nonreactive   HIV-1 p24 Ag & HIV-1/HIV-2 Ab Not Detected     Hepatitis C antibody   Result Value Ref Range    Hepatitis C Antibody  NR     Nonreactive   Assay performance characteristics have not been established for newborns,   infants, and children     Hepatitis B surface antigen   Result Value Ref Range    Hep B Surface Agn Nonreactive NR   T4 free   Result Value Ref Range    T4 Free 1.27 0.76 - 1.46 ng/dL            Consults:     No consultations were requested during this admission         Hospital Course:     Brian Rodas was admitted to [facility] with attending Hussein Pulliam MD on a 72 hour mental health hold and subsequently signed in voluntarily.  The patient was placed under status 15 (15 minute checks) to ensure patient safety. COWS monitoring for opiate withdrawal was initiated. CBC, BMP and utox obtained.  Patient  did not require seclusion or administration of emergency medications to manage behavior.  There  were no PTA medications to continue on admission. PRNs were made available for symptomatic relief of opiate withdrawal, including loperamide, clonidine, gabapentin, and Zofran. Patient used one dose of clonidine for mild withdrawal symptoms but otherwise felt physically well during hospitalization. Once detoxed, patient reported having no urges to self harm and no SI. Referral was made for patient to have CD assessment through his health insurance company.    The patient's symptoms of self harm urges improved.    Brian Rodas was discharged to home. At the time of discharge Brian Rodas was determined to not be a danger to himself or others.     Today Brian Rodas reports no self harm urges and no SI. In addition, Brian Rodas has notable risk factors for self-harm, including substance abuse. However, risk is mitigated by commitment to family, [employer] beliefs, sobriety, absence of past attempts, ability to volunteer a safety plan and history of seeking help when needed. Additional steps taken to minimize risk include: referral for CD assessment. Therefore, based on all available evidence including the factors cited above, Brian Rodas does not appear to be at imminent risk for self-harm, and is appropriate for outpatient level of care. However, if patient relapses on heroin, their risk of decompensation and SI/HI will be elevated. This was discussed with the patient.    This document serves as a transfer of care to Brian Rodas's outpatient providers.         Discharge Medications:     Psychiatry Medications Dose/Frequecy  none    Non Psychiatry Medications Dose/Frequency  none         Psychiatric Examination:     Appearance:  awake, alert and adequately groomed  Attitude:  cooperative  Eye Contact:  good  Mood:  better  Affect:  appropriate and in normal range  Speech:  clear, coherent and normal prosody  Psychomotor Behavior:  no evidence of tardive dyskinesia, dystonia, or tics  Thought Process:  logical,  linear and goal oriented  Associations:  no loose associations  Thought Content:  no evidence of suicidal ideation or homicidal ideation and no evidence of psychotic thought  Insight:  good  Judgment:  fair  Oriented to:  time, person, and place  Attention Span and Concentration:  intact  Recent and Remote Memory:  intact  Language: Able to name objects, Able to repeat phrases and Able to read and write  Fund of Knowledge: appropriate  Muscle Strength and Tone: normal  Gait and Station: Normal         Discharge Plan:     Tuesday March 28, 2017 - 1:00pm  Provider: [facility] Assessment/ intake assessment - Octavio Engle  Address: [facility] - [address] [place] [address] - staff Shelly scheduled intake direct phone: [phone]      ------------------------------------------------------------------------------------------------------------------------------  This documentation accurately reflects the services I personally performed and treatment decisions made by me in consultation with the attending physician.    Hussein Boykin PGY-1  2:38 PM 03/27/2017

## 2017-03-27 NOTE — PROGRESS NOTES
"   03/27/17 1248   Behavioral Health   Hallucinations denies / not responding to hallucinations   Thinking distractable   Orientation person: oriented;place: oriented;date: oriented   Insight admits / accepts   Judgement impaired   Eye Contact at examiner   Affect blunted, flat   Mood depressed   Physical Appearance/Attire attire appropriate to age and situation   Hygiene (fair )   Suicidality (denies )   Self Injury (none)   Activity (visible on unit )   Speech clear   Psychomotor / Gait balanced;steady   Activities of Daily Living   Hygiene/Grooming independent   Oral Hygiene independent   Dress independent   Room Organization independent   Pt. Visible on unit, able to attend some of the scheduled programming. Pt affect generally flat, social with select peers. Pt tells staff he is feeling \"better,\" states depression is improving. Pt denies any active SI or safety concerns at this time.   "

## 2017-03-27 NOTE — PLAN OF CARE
Problem: General Plan of Care (Inpatient Behavioral)  Goal: Individualization/Patient Specific Goal (IP Behavioral)  The patient and/or their representative will achieve their patient-specific goals related to the plan of care.    The patient-specific goals include:   No discussion of imr topic this pm

## 2017-03-27 NOTE — PLAN OF CARE
Problem: Depressive Symptoms  Goal: Depressive Symptoms  Signs and symptoms of listed problems will be absent or manageable.   Outcome: Adequate for Discharge Date Met:  03/27/17  Reviewed discharge instructions with client. He is on no medications. Says that he has no A/V hallucinations. He signed for all of his belongings. He states that he will attend an intake interview tomorrow for CD assessment. He verbalizes that he will not use heroin anymore. Has on discharge instructions various phone numbers and web sites for AA, etc. He was encouraged to contact narcotics anynymous for additional support. Denies SI/HI. [employer] was called and have agreed to  client at [facility].

## 2017-03-27 NOTE — PROGRESS NOTES
patient admitted to unit on 3/26/17 - evening for assessment after presenting at [facility] ED with suicidal thoughts in the context of recent relapse on heroion patient had been sober for quite some time, suidical thoughts and mood symptoms in the context of shame and disappointment in self for relapse.     patient was assessed by primary attending psychiatrist today as safe for discharge. This writer and patient met together and called to schedule an intake/assessment with out patient CD treatment program nearer his home. - Octavio McPherson - has scheduled an assessment for tomorrow at 1:00pm     Also received notice from financial case coordinator that patient's insurance states will term on 3/31/17 asked to check with patient if need be he should contact [facility]. Discussed with patient he is aware as [facility] is no longer providing PMAMP after this month he received notice of this change and was to choose a new PMAP and send back reports he did the forms choosing [facility] and sent in 3 days prior to admission.